# Patient Record
(demographics unavailable — no encounter records)

---

## 2017-03-26 NOTE — DIAGNOSTIC IMAGING REPORT
INDICATION: Right-sided rib pain.



FINDINGS:

Three views of the right ribs show no fracture or other bony

abnormality. There is no effusion or pneumothorax.



IMPRESSION:



Normal right ribs.



Dictated by:



Dictated on workstation # [id number]

## 2017-03-26 NOTE — DIAGNOSTIC IMAGING REPORT
INDICATION: Right-sided rib pain.



EXAMINATION: PA and lateral views of the chest.



FINDINGS: The heart size and vascularity are normal. Lungs are

clear. There is no effusion. There is no acute bony abnormality.



IMPRESSION:

No acute abnormality is seen.



Dictated by:



Dictated on workstation # [id number]
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
## 2019-08-13 NOTE — ED BACK PAIN
General


Chief Complaint:  Back Problems


Stated Complaint:  BACK PAIN


Source of Information:  Patient


Exam Limitations:  No Limitations





History of Present Illness


Date Seen by Provider:  Aug 13, 2019


Time Seen by Provider:  08:34


Initial Comments


The patient presents to ER by private conveyance with chief complaint that 

yesterday while at work he was lifting a large box and it slipped out of his 

hands and he experienced some severe pain in his low back. He denies a history 

of back problems, back, or surgeries to his back. He tried going back to work 

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

of the forklift. He does not have a back brace and has not taken any ibuprofen 

Tylenol or other pain meds. No topical creams. His girlfriend was planning to 

bring him some icy hot. His works at since it happened at work he needed to come

the ER to be checked out by occupational. No incontinence of bowel or bladder, 

numbness or tingling or weakness/falls. He did have some pain that radiated 

across his low back and occasionally down his left buttock. Worse with bending 

over.





Allergies and Home Medications


Allergies


Coded Allergies:  


     No Known Drug Allergies (Unverified , 8/10/15)





Home Medications


Cyclobenzaprine HCl 10 Mg Tablet, 10 MG PO Q8H PRN for SPASMS


   Prescribed by: KATY STOREY on 8/13/19 0848


Prednisone 20 Mg Tab, 20 MG PO DAILY


   Prescribed by: IVIS GAR on 3/26/17 0453





Patient Home Medication List


Home Medication List Reviewed:  Yes





Review of Systems


Constitutional:  No chills, No diaphoresis


EENTM:  No ear discharge, No ear pain


Respiratory:  No cough, No phlegm, No short of breath


Cardiovascular:  No chest pain, No edema


Gastrointestinal:  No abdominal pain, No nausea


Genitourinary:  No discharge, No dysuria


Musculoskeletal:  back pain; No joint pain





Past Medical-Social-Family Hx


Patient Social History


Alcohol Use:  Denies Use


Recreational Drug Use:  No


Smoking Status:  Current Everyday Smoker


Type Used:  Cigarettes


Recent Foreign Travel:  No


Contact w/Someone Who Travel:  No


Recent Hopitalizations:  No





Immunizations Up To Date


Tetanus Booster (TDap):  Unknown





Seasonal Allergies


Seasonal Allergies:  Yes





Past Medical History


Asthma


Currently Using CPAP:  No


Currently Using BIPAP:  No


Reproductive Disorders:  No


Fractures





Family Medical History


Heart Disease, Cancer





Physical Exam


Vital Signs





Vital Signs - First Documented








 8/13/19





 08:35


 


Temp 98.9


 


Pulse 71


 


Resp 18


 


B/P (MAP) 116/77 (90)


 


O2 Delivery Room Air





Capillary Refill :


Height, Weight, BMI


Height: 5'11.00"


Weight: 205lbs. 6.0oz. 93.792621zd; 32.0 BMI


Method:Stated


General Appearance:  WD/WN, Mild Distress


HEENT:  PERRL/EOMI, Moist Mucous Membranes


Neck:  Full Range of Motion, Normal Inspection


Cardiovascular:  Regular Rate, Rhythm, No Edema, Normal Peripheral Pulses


Respiratory:  No Accessory Muscle Use, No Respiratory Distress


Back:  Normal Inspection, No CVA Tenderness (paraVertebral lumbar), Muscle 

Spasm, Vertebral Tenderness (lumbar)


Extremity:  Normal Capillary Refill, Normal Inspection


Neurologic/Psychiatric:  Alert, Oriented x3, No Motor/Sensory Deficits, Other 

(normal gait)





Progress/Results/Core Measures


Results/Orders


My Orders





Orders - KATY STOREY


Ketorolac Injection (Toradol Injection) (8/13/19 08:45)


Acetaminophen  Tablet (Tylenol  Tablet) (8/13/19 08:45)





Medications Given in ED





Current Medications








 Medications  Dose


 Ordered  Sig/Amol


 Route  Start Time


 Stop Time Status Last Admin


Dose Admin


 


 Acetaminophen  1,000 mg  ONCE  ONCE


 PO  8/13/19 08:45


 8/13/19 08:46 DC 8/13/19 08:53


1,000 MG


 


 Ketorolac


 Tromethamine  60 mg  ONCE  ONCE


 IM  8/13/19 08:45


 8/13/19 08:46 DC 8/13/19 08:54


60 MG








Vital Signs/I&O











 8/13/19





 08:35


 


Temp 98.9


 


Pulse 71


 


Resp 18


 


B/P (MAP) 116/77 (90)


 


O2 Delivery Room Air











Progress


Progress Note :  


   Time:  08:45


Progress Note


Toradol Tylenol and prescription for muscle relaxants. We'll encourage back 

brace, heating pads and topical creams. Take today off and follow-up tomorrow on

a reduced work schedule.





Departure


Impression





   Primary Impression:  


   Lumbago


   Qualified Codes:  M54.5 - Low back pain


Disposition:  01 HOME, SELF-CARE


Condition:  Stable





Departure-Patient Inst.


Decision time for Depature:  08:46


Referrals:  


NO,LOCAL PHYSICIAN (PCP/Family)


Primary Care Physician


Patient Instructions:  Low Back Pain  (DC)





Add. Discharge Instructions:  


Continue to use Tylenol 1000 mg every 8 hours as necessary for pain. You can 

also use heating pads, topical creams and a back brace for relief.


Start using the naproxen twice daily for the next 1-2 weeks until your pain is 

improved.


If you're not seeing significant improvement in 1-2 weeks follow-up with either 

your primary care doctor or occupational health.


If you're having muscle spasms in your back you can take one tablet of the 

cyclobenzaprine every 8 hours as needed. It may cause drowsiness and should not 

be next with alcohol.


No lifting, pushing or pulling over 20 pounds until 8/20/19. 


All discharge instructions reviewed with patient and/or family. Voiced 

understanding.


Scripts


Cyclobenzaprine HCl (Cyclobenzaprine HCl) 10 Mg Tablet


10 MG PO Q8H PRN for SPASMS, #15 TAB 0 Refills


   Prov: KATY TSOREY         8/13/19


Work/School Note:  Work Release Form   Date Seen in the Emergency Department:  

Aug 13, 2019


   Return to Work:  Aug 14, 2019


   Restrictions:  Need Release from Doctor


   Other Restrictions Listed Below:  No pushing, lifting, pulling above 20 

pounds until 8/20/19.











KATY STOREY                 Aug 13, 2019 08:48